# Patient Record
Sex: MALE | Race: WHITE | NOT HISPANIC OR LATINO | URBAN - METROPOLITAN AREA
[De-identification: names, ages, dates, MRNs, and addresses within clinical notes are randomized per-mention and may not be internally consistent; named-entity substitution may affect disease eponyms.]

---

## 2024-08-08 ENCOUNTER — APPOINTMENT (OUTPATIENT)
Dept: PRIMARY CARE | Facility: CLINIC | Age: 39
End: 2024-08-08
Payer: COMMERCIAL

## 2024-08-08 VITALS
OXYGEN SATURATION: 99 % | HEIGHT: 64 IN | DIASTOLIC BLOOD PRESSURE: 82 MMHG | HEART RATE: 90 BPM | RESPIRATION RATE: 18 BRPM | BODY MASS INDEX: 29.88 KG/M2 | WEIGHT: 175 LBS | SYSTOLIC BLOOD PRESSURE: 130 MMHG

## 2024-08-08 DIAGNOSIS — Z00.00 HEALTHCARE MAINTENANCE: Primary | ICD-10-CM

## 2024-08-08 PROCEDURE — 3008F BODY MASS INDEX DOCD: CPT | Performed by: FAMILY MEDICINE

## 2024-08-08 PROCEDURE — 90715 TDAP VACCINE 7 YRS/> IM: CPT | Performed by: FAMILY MEDICINE

## 2024-08-08 PROCEDURE — 1036F TOBACCO NON-USER: CPT | Performed by: FAMILY MEDICINE

## 2024-08-08 PROCEDURE — 99385 PREV VISIT NEW AGE 18-39: CPT | Performed by: FAMILY MEDICINE

## 2024-08-08 PROCEDURE — 90471 IMMUNIZATION ADMIN: CPT | Performed by: FAMILY MEDICINE

## 2024-08-08 PROCEDURE — 93000 ELECTROCARDIOGRAM COMPLETE: CPT | Performed by: FAMILY MEDICINE

## 2024-08-08 ASSESSMENT — PATIENT HEALTH QUESTIONNAIRE - PHQ9
2. FEELING DOWN, DEPRESSED OR HOPELESS: NOT AT ALL
1. LITTLE INTEREST OR PLEASURE IN DOING THINGS: NOT AT ALL
SUM OF ALL RESPONSES TO PHQ9 QUESTIONS 1 AND 2: 0

## 2024-08-08 ASSESSMENT — ENCOUNTER SYMPTOMS
HEADACHES: 0
SHORTNESS OF BREATH: 0

## 2024-08-08 NOTE — PROGRESS NOTES
"Subjective     Chaparro Umanzor is a 39 y.o. male who presents for Annual Exam.    HPI     Pt is new to practice.  He is doing well.  No concerns.     Review of Systems   Respiratory:  Negative for shortness of breath.    Cardiovascular:  Negative for chest pain.   Neurological:  Negative for headaches.       Objective     Vitals:    24 0818   BP: 130/82   BP Location: Left arm   Patient Position: Sitting   Pulse: 90   Resp: 18   SpO2: 99%   Weight: 79.4 kg (175 lb)   Height: 1.626 m (5' 4\")        No current outpatient medications     No Known Allergies     History reviewed. No pertinent surgical history.     Social History     Tobacco Use    Smoking status: Former     Current packs/day: 0.00     Average packs/day: 0.3 packs/day for 5.2 years (1.3 ttl pk-yrs)     Types: Cigarettes     Start date: 2005     Quit date: 2010     Years since quittin.6    Smokeless tobacco: Never    Tobacco comments:     Occasionally smoked in my early/mid twenties   Vaping Use    Vaping status: Never Used   Substance Use Topics    Alcohol use: Yes     Alcohol/week: 3.0 standard drinks of alcohol     Types: 3 Glasses of wine per week    Drug use: Never        Social History     Substance and Sexual Activity   Alcohol Use Yes    Alcohol/week: 3.0 standard drinks of alcohol    Types: 3 Glasses of wine per week       Family History   Problem Relation Name Age of Onset    Skin cancer Mother Samara Umanzor     Amyloidosis Mother Samara Umanzor     Hypertension Father Tim Umanzor     Manley's palsy Father Tim Umanzor     Transient ischemic attack Father Tim Umanzor         Immunization History   Administered Date(s) Administered    MMR vaccine, subcutaneous (MMR II) 1998    Moderna SARS-CoV-2 Vaccination 2021, 2021, 2021    Tdap vaccine, age 7 year and older (BOOSTRIX, ADACEL) 2024        Physical Exam  Vitals reviewed.   Constitutional:       General: He is not in acute distress.     Appearance: " Normal appearance.   HENT:      Head: Normocephalic and atraumatic.      Right Ear: Tympanic membrane, ear canal and external ear normal.      Left Ear: Tympanic membrane, ear canal and external ear normal.      Nose: Nose normal.      Mouth/Throat:      Mouth: Mucous membranes are moist.      Pharynx: Oropharynx is clear. No oropharyngeal exudate or posterior oropharyngeal erythema.   Eyes:      Extraocular Movements: Extraocular movements intact.      Pupils: Pupils are equal, round, and reactive to light.   Neck:      Thyroid: No thyroid mass or thyromegaly.   Cardiovascular:      Rate and Rhythm: Normal rate and regular rhythm.      Heart sounds: No murmur heard.  Pulmonary:      Effort: Pulmonary effort is normal. No respiratory distress.      Breath sounds: Normal breath sounds. No wheezing, rhonchi or rales.   Abdominal:      General: Abdomen is flat. There is no distension.      Palpations: Abdomen is soft.      Tenderness: There is no abdominal tenderness.   Genitourinary:     Testes: Normal.   Musculoskeletal:         General: Normal range of motion.   Lymphadenopathy:      Cervical: No cervical adenopathy.   Skin:     General: Skin is warm and dry.      Findings: No rash.   Neurological:      General: No focal deficit present.      Mental Status: He is alert and oriented to person, place, and time. Mental status is at baseline.   Psychiatric:         Mood and Affect: Mood normal.         Behavior: Behavior normal.         Problem List Items Addressed This Visit    None  Visit Diagnoses       Healthcare maintenance    -  Primary    Relevant Orders    ECG 12 lead (Clinic Performed) (Completed)    Comprehensive Metabolic Panel    Lipid Panel    CBC and Auto Differential    Hemoglobin A1C            Assessment/Plan     Well Exam - new patient     Vaccines - tdap given today     I recommend yearly flu vaccine and routine COVID vaccinations as indicated     Complete labs    Healthy diet, routine exercise was  discussed with patient      Follow up in 1 year or sooner if needed

## 2024-08-20 ENCOUNTER — APPOINTMENT (OUTPATIENT)
Dept: PRIMARY CARE | Facility: CLINIC | Age: 39
End: 2024-08-20
Payer: COMMERCIAL

## 2024-08-22 ENCOUNTER — LAB (OUTPATIENT)
Dept: LAB | Facility: LAB | Age: 39
End: 2024-08-22
Payer: COMMERCIAL

## 2024-08-22 DIAGNOSIS — Z00.00 HEALTHCARE MAINTENANCE: ICD-10-CM

## 2024-08-22 LAB
ALBUMIN SERPL BCP-MCNC: 4.6 G/DL (ref 3.4–5)
ALP SERPL-CCNC: 57 U/L (ref 33–120)
ALT SERPL W P-5'-P-CCNC: 32 U/L (ref 10–52)
ANION GAP SERPL CALC-SCNC: 10 MMOL/L (ref 10–20)
AST SERPL W P-5'-P-CCNC: 16 U/L (ref 9–39)
BASOPHILS # BLD AUTO: 0.04 X10*3/UL (ref 0–0.1)
BASOPHILS NFR BLD AUTO: 0.8 %
BILIRUB SERPL-MCNC: 0.6 MG/DL (ref 0–1.2)
BUN SERPL-MCNC: 13 MG/DL (ref 6–23)
CALCIUM SERPL-MCNC: 10 MG/DL (ref 8.6–10.3)
CHLORIDE SERPL-SCNC: 103 MMOL/L (ref 98–107)
CHOLEST SERPL-MCNC: 275 MG/DL (ref 0–199)
CHOLESTEROL/HDL RATIO: 4.8
CO2 SERPL-SCNC: 31 MMOL/L (ref 21–32)
CREAT SERPL-MCNC: 1.17 MG/DL (ref 0.5–1.3)
EGFRCR SERPLBLD CKD-EPI 2021: 81 ML/MIN/1.73M*2
EOSINOPHIL # BLD AUTO: 0.18 X10*3/UL (ref 0–0.7)
EOSINOPHIL NFR BLD AUTO: 3.4 %
ERYTHROCYTE [DISTWIDTH] IN BLOOD BY AUTOMATED COUNT: 12.7 % (ref 11.5–14.5)
EST. AVERAGE GLUCOSE BLD GHB EST-MCNC: 85 MG/DL
GLUCOSE SERPL-MCNC: 96 MG/DL (ref 74–99)
HBA1C MFR BLD: 4.6 %
HCT VFR BLD AUTO: 45.9 % (ref 41–52)
HDLC SERPL-MCNC: 57.7 MG/DL
HGB BLD-MCNC: 15 G/DL (ref 13.5–17.5)
IMM GRANULOCYTES # BLD AUTO: 0.02 X10*3/UL (ref 0–0.7)
IMM GRANULOCYTES NFR BLD AUTO: 0.4 % (ref 0–0.9)
LDLC SERPL CALC-MCNC: 192 MG/DL
LYMPHOCYTES # BLD AUTO: 1.59 X10*3/UL (ref 1.2–4.8)
LYMPHOCYTES NFR BLD AUTO: 30.3 %
MCH RBC QN AUTO: 30 PG (ref 26–34)
MCHC RBC AUTO-ENTMCNC: 32.7 G/DL (ref 32–36)
MCV RBC AUTO: 92 FL (ref 80–100)
MONOCYTES # BLD AUTO: 0.42 X10*3/UL (ref 0.1–1)
MONOCYTES NFR BLD AUTO: 8 %
NEUTROPHILS # BLD AUTO: 3 X10*3/UL (ref 1.2–7.7)
NEUTROPHILS NFR BLD AUTO: 57.1 %
NON HDL CHOLESTEROL: 217 MG/DL (ref 0–149)
NRBC BLD-RTO: 0 /100 WBCS (ref 0–0)
PLATELET # BLD AUTO: 204 X10*3/UL (ref 150–450)
POTASSIUM SERPL-SCNC: 4.7 MMOL/L (ref 3.5–5.3)
PROT SERPL-MCNC: 7.1 G/DL (ref 6.4–8.2)
RBC # BLD AUTO: 5 X10*6/UL (ref 4.5–5.9)
SODIUM SERPL-SCNC: 139 MMOL/L (ref 136–145)
TRIGL SERPL-MCNC: 126 MG/DL (ref 0–149)
VLDL: 25 MG/DL (ref 0–40)
WBC # BLD AUTO: 5.3 X10*3/UL (ref 4.4–11.3)

## 2024-08-22 PROCEDURE — 83036 HEMOGLOBIN GLYCOSYLATED A1C: CPT

## 2024-08-22 PROCEDURE — 80053 COMPREHEN METABOLIC PANEL: CPT

## 2024-08-22 PROCEDURE — 36415 COLL VENOUS BLD VENIPUNCTURE: CPT

## 2024-08-22 PROCEDURE — 80061 LIPID PANEL: CPT

## 2024-08-22 PROCEDURE — 85025 COMPLETE CBC W/AUTO DIFF WBC: CPT

## 2024-11-09 ENCOUNTER — ANCILLARY PROCEDURE (OUTPATIENT)
Dept: URGENT CARE | Age: 39
End: 2024-11-09
Payer: COMMERCIAL

## 2024-11-09 ENCOUNTER — OFFICE VISIT (OUTPATIENT)
Dept: URGENT CARE | Age: 39
End: 2024-11-09
Payer: COMMERCIAL

## 2024-11-09 VITALS
BODY MASS INDEX: 28.17 KG/M2 | RESPIRATION RATE: 20 BRPM | DIASTOLIC BLOOD PRESSURE: 86 MMHG | TEMPERATURE: 98.2 F | WEIGHT: 165 LBS | OXYGEN SATURATION: 98 % | SYSTOLIC BLOOD PRESSURE: 137 MMHG | HEART RATE: 73 BPM | HEIGHT: 64 IN

## 2024-11-09 DIAGNOSIS — M79.672 ACUTE FOOT PAIN, LEFT: ICD-10-CM

## 2024-11-09 DIAGNOSIS — S92.352A CLOSED DISPLACED FRACTURE OF FIFTH METATARSAL BONE OF LEFT FOOT, INITIAL ENCOUNTER: Primary | ICD-10-CM

## 2024-11-09 DIAGNOSIS — S92.215B: ICD-10-CM

## 2024-11-09 PROCEDURE — 73630 X-RAY EXAM OF FOOT: CPT | Mod: LEFT SIDE

## 2024-11-09 ASSESSMENT — ENCOUNTER SYMPTOMS
NUMBNESS: 0
WEAKNESS: 0
JOINT SWELLING: 1
MYALGIAS: 0
NECK PAIN: 0
NECK STIFFNESS: 0
BACK PAIN: 0

## 2024-11-09 ASSESSMENT — PAIN SCALES - GENERAL: PAINLEVEL_OUTOF10: 7

## 2024-11-09 NOTE — PROGRESS NOTES
"Subjective   Patient ID: Chaparro Umanzor is a 39 y.o. male. They present today with a chief complaint of Injury (Slipped off a ladder today.  His left foot got caught in a rung as he was falling.  Difficult putting weight on the foot.).    History of Present Illness  Subjective  Chaparro Umanzor is a 39 y.o. male who presents with left foot pain. Onset of the symptoms was today. Precipitating event: Foot became stuck in ladder rung during fall. Denies head or neck injury. Current symptoms include: ability to bear weight, but with some pain, bruising, pain at the lateral and distal aspect of the fifth metatarsal and swelling. Aggravating factors: any weight bearing, standing, and walking. Symptoms have progressed to a point and plateaued. Patient has had no prior foot problems. Evaluation to date: none. Treatment to date: avoidance of offending activity.    Objective  /86 (BP Location: Left arm, Patient Position: Sitting)   Pulse 73   Temp 36.8 °C (98.2 °F)   Resp 20   Ht 1.626 m (5' 4\")   Wt 74.8 kg (165 lb)   SpO2 98%   BMI 28.32 kg/m²   Right foot:  normal exam, no swelling, tenderness, instability; ligaments intact, full range of motion of all ankle/foot joints  Left foot:  soft tissue swelling noted over the distal and proximal fifth metatarsal , tenderness of the 5th metatarsal head, ecchymoses and swelling of the 5th toe, and normal microcirculation and capillary reflow    Imaging:  X-ray of the left foot: fracture of distal fifth metatarsal and cuboid.     Assessment/Plan  Fracture of distal fifth metatarsal and cuboid..  Natural history and expected course discussed. Questions answered.  Educational material distributed.  Rest, ice, compression, and elevation (RICE) therapy.  OTC analgesics as needed.  Orthopedics referral.  Follow up in 1 day.        History provided by:  Patient   used: No    Injury  Associated symptoms: no myalgias        Past Medical History  Allergies as of " "11/09/2024    (No Known Allergies)       (Not in a hospital admission)       Past Medical History:   Diagnosis Date    Allergic 2022       No past surgical history on file.     reports that he quit smoking about 14 years ago. His smoking use included cigarettes. He started smoking about 19 years ago. He has a 1.3 pack-year smoking history. He has never used smokeless tobacco. He reports current alcohol use of about 3.0 standard drinks of alcohol per week. He reports that he does not use drugs.    Review of Systems  Review of Systems   Musculoskeletal:  Positive for gait problem and joint swelling. Negative for back pain, myalgias, neck pain and neck stiffness.   Neurological:  Negative for weakness and numbness.                                  Objective    Vitals:    11/09/24 1507   BP: 137/86   BP Location: Left arm   Patient Position: Sitting   Pulse: 73   Resp: 20   Temp: 36.8 °C (98.2 °F)   SpO2: 98%   Weight: 74.8 kg (165 lb)   Height: 1.626 m (5' 4\")     No LMP for male patient.    Physical Exam  Vitals and nursing note reviewed.   Constitutional:       General: He is not in acute distress.     Appearance: Normal appearance. He is normal weight. He is not ill-appearing, toxic-appearing or diaphoretic.   Cardiovascular:      Rate and Rhythm: Normal rate.      Pulses: Normal pulses.   Pulmonary:      Effort: Pulmonary effort is normal.   Musculoskeletal:         General: Swelling, tenderness and signs of injury present. No deformity.   Skin:     General: Skin is warm and dry.      Capillary Refill: Capillary refill takes less than 2 seconds.      Findings: Bruising present.   Neurological:      General: No focal deficit present.      Mental Status: He is alert.      Sensory: No sensory deficit.         Procedures    Point of Care Test & Imaging Results from this visit  No results found for this visit on 11/09/24.   No results found.    Diagnostic study results (if any) were reviewed by Pola Becerril, " APRN-CNP.    Assessment/Plan   Allergies, medications, history, and pertinent labs/EKGs/Imaging reviewed by KARINA Castillo.     Medical Decision Making    Urgent Care Course:  Patient presents to the ED with left foot pain after injury.   Patient is afebrile, hemodynamically stable.  Patient denies fever, n/v, cp, sob, ab pain, dysuria, diarrhea.  XR of left foot with nondisplaced fracture of fifth metatarsal and possible fracture left cuboid.  Applied splint and provided with crutches.  He was referred to ortho walk-in clinic and will follow-up Monday morning.  Patient educated about fractures and will take acetaminophen and ibuprofen for pain. Patient given fracture warning signs ortho.   Prior external records reviewed: Outpatient records  Reviewed pertinent findings from resulted labs: None   Imaging and EKG: Interpretation by radiology and independently interpreted by me.  XR of ankle - no acute fx/dislocation   Independent Hx provided by:  Patient  Med Rx considered but ultimately not given:  Opioids  Social determinant that may affects healthcare:  None  Pt's case/impression summarized and discussed with:  Patient  Likely Dx given clinical picture:  Nondisplaced fracture of fifth metatarsal and left cuboid  Although not an exhaustive list of Differential Diagnosis (though considered), patient's HPI, PE, and other findings are not suggestive of:  L ankle sprain, L ankle fracture, gout, pseudogout, ankle dislocation, osteomyelitis    Patient at time of discharge was clinically well-appearing and HDS for outpatient management. The patient and/or family was given the opportunity to ask questions prior to discharge, understood my verbal discussion of the plans for treatment, expected course, indications to return to ED, and the need for timely follow up as directed.    Condition: Stable  Disposition: Discharge    Orders and Diagnoses  Diagnoses and all orders for this visit:  Closed displaced fracture of  fifth metatarsal bone of left foot, initial encounter  -     XR foot left 3+ views; Future      Medical Admin Record      Patient disposition: Home    Electronically signed by MICAH Castillo-ZE  3:39 PM

## 2024-11-09 NOTE — PATIENT INSTRUCTIONS
Given  handout for injury clinic  RICE  NSAID/Tylenol as needed  Advised on healing time for injury  Avoid any activity that will worsen pain  Avoid weightbearing and use crutches  Advised on s/s to seek emergent care for

## 2024-11-11 ENCOUNTER — OFFICE VISIT (OUTPATIENT)
Dept: ORTHOPEDIC SURGERY | Facility: CLINIC | Age: 39
End: 2024-11-11
Payer: COMMERCIAL

## 2024-11-11 DIAGNOSIS — S92.352A CLOSED FRACTURE OF BASE OF FIFTH METATARSAL BONE OF LEFT FOOT, INITIAL ENCOUNTER: ICD-10-CM

## 2024-11-11 PROCEDURE — 28450 TX TARSAL B1 FX W/O MNPJ EA: CPT | Performed by: INTERNAL MEDICINE

## 2024-11-11 PROCEDURE — 99204 OFFICE O/P NEW MOD 45 MIN: CPT | Performed by: INTERNAL MEDICINE

## 2024-11-11 PROCEDURE — 99213 OFFICE O/P EST LOW 20 MIN: CPT | Mod: 57 | Performed by: INTERNAL MEDICINE

## 2024-11-11 PROCEDURE — 28470 CLTX METATARSAL FX WO MNP EA: CPT | Performed by: INTERNAL MEDICINE

## 2024-11-11 NOTE — PROGRESS NOTES
Acute Injury New Patient Visit    CC:   Chief Complaint   Patient presents with    Left Foot - Pain       HPI: Chaparro is a 39 y.o. male presents today for initial evaluation for acute left foot injury sustained two days ago after he slipped from a ladder and his left foot got caught in a ladder. He was evaluated at the urgent care, x-rays were taken. He is here for initial evaluation.         Review of Systems   GENERAL: Negative for malaise, significant weight loss, fever  MUSCULOSKELETAL: See HPI  NEURO:  Negative for numbness / tingling     Past Medical History  Past Medical History:   Diagnosis Date    Allergic        Medication review  Medication Documentation Review Audit       Reviewed by KARINA Castillo (Nurse Practitioner) on 24 at 1606      Medication Order Taking? Sig Documenting Provider Last Dose Status            No Medications to Display                                   Allergies  No Known Allergies    Social History  Social History     Socioeconomic History    Marital status:      Spouse name: Allie    Number of children: 2    Years of education: Not on file    Highest education level: Not on file   Occupational History    Occupation: Tech consultant   Tobacco Use    Smoking status: Former     Current packs/day: 0.00     Average packs/day: 0.3 packs/day for 5.2 years (1.3 ttl pk-yrs)     Types: Cigarettes     Start date: 2005     Quit date: 2010     Years since quittin.8    Smokeless tobacco: Never    Tobacco comments:     Occasionally smoked in my early/mid twenties   Vaping Use    Vaping status: Never Used   Substance and Sexual Activity    Alcohol use: Yes     Alcohol/week: 3.0 standard drinks of alcohol     Types: 3 Glasses of wine per week    Drug use: Never    Sexual activity: Yes     Partners: Female     Birth control/protection: Other   Other Topics Concern    Not on file   Social History Narrative    Not on file     Social Drivers of Health      Financial Resource Strain: Not on file   Food Insecurity: Not on file   Transportation Needs: Not on file   Physical Activity: Not on file   Stress: Not on file   Social Connections: Not on file   Intimate Partner Violence: Not on file   Housing Stability: Not on file       Surgical History  No past surgical history on file.    Physical Exam:  GENERAL:  Patient is awake, alert, and oriented to person place and time.  Patient appears well nourished and well kept.  Affect Calm, Not Acutely Distressed.  HEENT:  Normocephalic, Atraumatic, EOMI  CARDIOVASCULAR:  Hemodynamically stable.  RESPIRATORY:  Normal respirations with unlabored breathing.  Extremity: Left foot examination shows skin is intact.  There is no erythema or warmth.  Swelling on the lateral aspect the left foot with slight bruising and ecchymosis.  No obvious deformity.  There is no clinical signs of infection.  Pain over the distal fifth metatarsal bone.  There is no pain over the base of the fifth metatarsal bone.  There is no pain in the midfoot.  No pain over the metatarsal bones.  Pain of the cuboid bone.  There is no pain in the calcaneus.  There is no pain over the plantar aponeurosis.  There is no pain over the proximal phalanx of the right great toe.  No pain over distal phalanx of the right great toe.  Neurovascularly intact.      Diagnostics: X-rays reviewed  XR foot left 3+ views  Narrative: Interpreted By:  Dorene Maradiaga,   STUDY:  Left foot, 3 views      INDICATION:  Signs/Symptoms:fell off of a ladder today and got his left foot  caught in a rung      COMPARISON:  None.      ACCESSION NUMBER(S):  [id number]      ORDERING CLINICIAN:  FRANCESCA XIAO      FINDINGS:  Nondisplaced and mildly angulated 5th metatarsal neck fracture.  Subtle lucency of the lateral cuboid on oblique view equivocal for a  small nondisplaced fracture. No significant degenerative changes.  Soft tissues are within normal limits.      Impression: 1. Nondisplaced  and mildly angulated 5th metatarsal neck fracture.  2. Subtle lucency of the lateral cuboid on oblique view equivocal for  a small nondisplaced fracture.      MACRO:  None.      Signed by: Dorene Maradiaga 11/9/2024 3:44 PM  Dictation workstation:   [id number]      Procedure: None    Assessment:   Left distal fifth metatarsal fracture  Left cuboid fracture     Plan: Chaparro presents today for initial evaluation for acute left foot injury sustained two days ago after he slipped from a ladder and his left foot got caught in the ladder. We recommended non surgical treatment by placing him into a walking fracture boot, he may weight bear as tolerated.  He may take the fracture boot off to shower and skin care.  He will follow-up in 3-4 weeks, we will repeat, x-rays of the left foot, 3 views, AP, lateral, and oblique views.     No orders of the defined types were placed in this encounter.     At the conclusion of the visit there were no further questions by the patient/family regarding their plan of care.  Patient was instructed to call or return with any issues, questions, or concerns regarding their injury and/or treatment plan described above.     11/11/24 at 11:21 AM - Chuck Lopez MD  Scribe Attestation  By signing my name below, I, Iban Mindi, Scribfrancisco   attest that this documentation has been prepared under the direction and in the presence of Chuck Lopez MD.    Office: [phone]    This note was prepared using voice recognition software.  The details of this note are correct and have been reviewed, and corrected to the best of my ability.  Some grammatical errors may persist related to the Dragon software.

## 2024-11-11 NOTE — LETTER
November 11, 2024     Patient: Chaparro Umanzor   YOB: 1985   Date of Visit: 11/11/2024       To Whom It May Concern:    It is my medical opinion that Chaparro Umanzor  may return to work with sit down work only for 3 weeks .    If you have any questions or concerns, please don't hesitate to call.         Sincerely,        Chuck Lopez MD

## 2024-12-05 ENCOUNTER — OFFICE VISIT (OUTPATIENT)
Dept: ORTHOPEDIC SURGERY | Facility: CLINIC | Age: 39
End: 2024-12-05
Payer: COMMERCIAL

## 2024-12-05 ENCOUNTER — APPOINTMENT (OUTPATIENT)
Dept: ORTHOPEDIC SURGERY | Facility: CLINIC | Age: 39
End: 2024-12-05
Payer: COMMERCIAL

## 2024-12-05 ENCOUNTER — HOSPITAL ENCOUNTER (OUTPATIENT)
Dept: RADIOLOGY | Facility: CLINIC | Age: 39
Discharge: HOME | End: 2024-12-05
Payer: COMMERCIAL

## 2024-12-05 DIAGNOSIS — S92.215B: ICD-10-CM

## 2024-12-05 DIAGNOSIS — S92.352A CLOSED DISPLACED FRACTURE OF FIFTH METATARSAL BONE OF LEFT FOOT, INITIAL ENCOUNTER: ICD-10-CM

## 2024-12-05 PROCEDURE — 73630 X-RAY EXAM OF FOOT: CPT | Mod: LT

## 2024-12-05 PROCEDURE — 99211 OFF/OP EST MAY X REQ PHY/QHP: CPT | Performed by: INTERNAL MEDICINE

## 2024-12-05 NOTE — PROGRESS NOTES
CC:   Chief Complaint   Patient presents with    Left Foot - Follow-up     Left distal fifth metatarsal fracture  Left cuboid fracture   Xrays today       HPI: Chaparro is a 39 y.o. male presents today for reevaluation for left distal fifth metatarsal fracture and left cuboid fracture. He states that he is doing well. Repeat x-rays today. He is currently wearing his fracture boot.           Review of Systems   GENERAL: Negative for malaise, significant weight loss, fever  MUSCULOSKELETAL: See HPI  NEURO:  Negative for numbness / tingling     Past Medical History  Past Medical History:   Diagnosis Date    Allergic        Medication review  Medication Documentation Review Audit       Reviewed by Aniya Brumfield MA (Medical Assistant) on 24 at 0924      Medication Order Taking? Sig Documenting Provider Last Dose Status            No Medications to Display                                   Allergies  No Known Allergies    Social History  Social History     Socioeconomic History    Marital status:      Spouse name: Allie    Number of children: 2    Years of education: Not on file    Highest education level: Not on file   Occupational History    Occupation: Tech consultant   Tobacco Use    Smoking status: Former     Current packs/day: 0.00     Average packs/day: 0.3 packs/day for 5.2 years (1.3 ttl pk-yrs)     Types: Cigarettes     Start date: 2005     Quit date: 2010     Years since quittin.9    Smokeless tobacco: Never    Tobacco comments:     Occasionally smoked in my early/mid twenties   Vaping Use    Vaping status: Never Used   Substance and Sexual Activity    Alcohol use: Yes     Alcohol/week: 3.0 standard drinks of alcohol     Types: 3 Glasses of wine per week    Drug use: Never    Sexual activity: Yes     Partners: Female     Birth control/protection: Other   Other Topics Concern    Not on file   Social History Narrative    Not on file     Social Drivers of Health     Financial  Resource Strain: Not on file   Food Insecurity: Not on file   Transportation Needs: Not on file   Physical Activity: Not on file   Stress: Not on file   Social Connections: Not on file   Intimate Partner Violence: Not on file   Housing Stability: Not on file       Surgical History  History reviewed. No pertinent surgical history.    Physical Exam:  GENERAL:  Patient is awake, alert, and oriented to person place and time.  Patient appears well nourished and well kept.  Affect Calm, Not Acutely Distressed.  HEENT:  Normocephalic, Atraumatic, EOMI  CARDIOVASCULAR:  Hemodynamically stable.  RESPIRATORY:  Normal respirations with unlabored breathing.  Extremity: Left foot examination shows skin is intact.  There is no erythema or warmth.  Resolved swelling on the lateral aspect the left foot with resolved bruising and ecchymosis.  No obvious deformity.  There is no clinical signs of infection.  Minimal pain over the distal fifth metatarsal bone.  There is no pain over the base of the fifth metatarsal bone.  There is no pain in the midfoot.  No pain over the metatarsal bones.  No pain of the cuboid bone.  There is no pain in the calcaneus.  There is no pain over the plantar aponeurosis.  There is no pain over the proximal phalanx of the right great toe.  No pain over distal phalanx of the right great toe.  Neurovascularly intact.       Diagnostics: X-rays reviewed  XR foot left 3+ views  Narrative: Interpreted By:  Dorene Maradiaga,   STUDY:  Left foot, 3 views      INDICATION:  Signs/Symptoms:fell off of a ladder today and got his left foot  caught in a rung      COMPARISON:  None.      ACCESSION NUMBER(S):  [id number]      ORDERING CLINICIAN:  FRANCESCA XIAO      FINDINGS:  Nondisplaced and mildly angulated 5th metatarsal neck fracture.  Subtle lucency of the lateral cuboid on oblique view equivocal for a  small nondisplaced fracture. No significant degenerative changes.  Soft tissues are within normal limits.       Impression: 1. Nondisplaced and mildly angulated 5th metatarsal neck fracture.  2. Subtle lucency of the lateral cuboid on oblique view equivocal for  a small nondisplaced fracture.      MACRO:  None.      Signed by: Dorene Maradiaga 11/9/2024 3:44 PM  Dictation workstation:   [id number]        Procedure: None    Assessment:   Left distal fifth metatarsal fracture  Left cuboid fracture     Plan: Chaparro presents today for reevaluation for left distal fifth metatarsal fracture and left cuboid fracture. He is clinically doing well, no pain. X-rays showed satisfactory healing fracture. We placed him into a carbon fiber plate, he may weight-bear as tolerated. He will follow-up in 4-5 weeks, we will repeat x-rays of the left foot, 3 views, AP, lateral, and oblique views.     Orders Placed This Encounter    XR foot left 3+ views      At the conclusion of the visit there were no further questions by the patient/family regarding their plan of care.  Patient was instructed to call or return with any issues, questions, or concerns regarding their injury and/or treatment plan described above.     12/05/24 at 9:35 AM - Chuck Lopez MD  Scribe Attestation  By signing my name below, I, Iban Mindi, Scribfrancisco   attest that this documentation has been prepared under the direction and in the presence of Chuck Lopez MD.    Office: [phone]    This note was prepared using voice recognition software.  The details of this note are correct and have been reviewed, and corrected to the best of my ability.  Some grammatical errors may persist related to the Dragon software.

## 2025-01-02 ENCOUNTER — OFFICE VISIT (OUTPATIENT)
Dept: ORTHOPEDIC SURGERY | Facility: CLINIC | Age: 40
End: 2025-01-02
Payer: COMMERCIAL

## 2025-01-02 ENCOUNTER — HOSPITAL ENCOUNTER (OUTPATIENT)
Dept: RADIOLOGY | Facility: CLINIC | Age: 40
Discharge: HOME | End: 2025-01-02
Payer: COMMERCIAL

## 2025-01-02 DIAGNOSIS — S92.352A CLOSED DISPLACED FRACTURE OF FIFTH METATARSAL BONE OF LEFT FOOT, INITIAL ENCOUNTER: ICD-10-CM

## 2025-01-02 PROCEDURE — 73630 X-RAY EXAM OF FOOT: CPT | Mod: LT

## 2025-01-02 PROCEDURE — 99024 POSTOP FOLLOW-UP VISIT: CPT | Performed by: INTERNAL MEDICINE

## 2025-01-02 PROCEDURE — 99211 OFF/OP EST MAY X REQ PHY/QHP: CPT | Performed by: INTERNAL MEDICINE

## 2025-01-02 PROCEDURE — 73630 X-RAY EXAM OF FOOT: CPT | Mod: LEFT SIDE | Performed by: INTERNAL MEDICINE

## 2025-01-02 PROCEDURE — 1036F TOBACCO NON-USER: CPT | Performed by: INTERNAL MEDICINE

## 2025-01-02 NOTE — PROGRESS NOTES
CC:   No chief complaint on file.      HPI: Chaparro is a 39 y.o. male presents today for reevaluation for left distal fifth metatarsal fracture and left cuboid fracture. He states that he is doing well. Repeat x-rays today. He is currently wearing his fracture boot.         Review of Systems   GENERAL: Negative for malaise, significant weight loss, fever  MUSCULOSKELETAL: See HPI  NEURO:  Negative for numbness / tingling     Past Medical History  Past Medical History:   Diagnosis Date    Allergic 2022       Medication review  Medication Documentation Review Audit       Reviewed by Aniya Brumfield MA (Medical Assistant) on 12/05/24 at 0924      Medication Order Taking? Sig Documenting Provider Last Dose Status            No Medications to Display                                   Allergies  No Known Allergies    Social History  Social History     Socioeconomic History    Marital status:      Spouse name: Allie    Number of children: 2    Years of education: Not on file    Highest education level: Not on file   Occupational History    Occupation: Tech consultant   Tobacco Use    Smoking status: Former     Current packs/day: 0.00     Average packs/day: 0.3 packs/day for 5.2 years (1.3 ttl pk-yrs)     Types: Cigarettes     Start date: 1/1/2005     Quit date: 1/1/2010     Years since quitting: 15.0    Smokeless tobacco: Never    Tobacco comments:     Occasionally smoked in my early/mid twenties   Vaping Use    Vaping status: Never Used   Substance and Sexual Activity    Alcohol use: Yes     Alcohol/week: 3.0 standard drinks of alcohol     Types: 3 Glasses of wine per week    Drug use: Never    Sexual activity: Yes     Partners: Female     Birth control/protection: Other   Other Topics Concern    Not on file   Social History Narrative    Not on file     Social Drivers of Health     Financial Resource Strain: Not on file   Food Insecurity: Not on file   Transportation Needs: Not on file   Physical Activity: Not  on file   Stress: Not on file   Social Connections: Not on file   Intimate Partner Violence: Not on file   Housing Stability: Not on file       Surgical History  No past surgical history on file.    Physical Exam:  GENERAL:  Patient is awake, alert, and oriented to person place and time.  Patient appears well nourished and well kept.  Affect Calm, Not Acutely Distressed.  HEENT:  Normocephalic, Atraumatic, EOMI  CARDIOVASCULAR:  Hemodynamically stable.  RESPIRATORY:  Normal respirations with unlabored breathing.  Extremity: Left foot examination shows skin is intact.  There is no erythema or warmth.  No swelling on the lateral aspect the left foot with resolved bruising and ecchymosis.  No obvious deformity.  There is no clinical signs of infection.  No pain over the distal fifth metatarsal bone.  There is no pain over the base of the fifth metatarsal bone.  There is no pain in the midfoot.  No pain over the metatarsal bones.  No pain of the cuboid bone.  There is no pain in the calcaneus.  There is no pain over the plantar aponeurosis.  There is no pain over the proximal phalanx of the right great toe.  No pain over distal phalanx of the right great toe.  Neurovascularly intact.       Diagnostics: X-rays reviewed  XR foot left 3+ views  Interpreted By:  Chuck Mcgovern,   STUDY:  XR FOOT LEFT 3+ VIEWS, 12/5/2024 9:32 am      INDICATION:  Signs/Symptoms:pain      ACCESSION NUMBER(S):  [id number]      ORDERING CLINICIAN:  CHUCK MCGOVERN      FINDINGS:  Left foot x-rays three views AP, lateral and oblique view: Stable  appearing and satisfactory healing nondisplaced 5th metatarsal neck  fracture, showing signs of interval healing with increased callus  formation. Satisfactory healing nondisplaced cuboid fracture of the  lateral aspect, showing signs of interval healing with increased  callus formation.          Signed by: Chuck Mcgovern 12/5/2024 12:34 PM  Dictation workstation:   [id number]        Procedure:  None    Assessment:   Left distal fifth metatarsal fracture  Left cuboid fracture     Plan: Chaparro presents today for reevaluation for left distal fifth metatarsal fracture and left cuboid fracture. He is clinically doing well, no pain. X-rays showed satisfactory healing fracture.  He will continue with his carbon fiber plate, he may weight-bear as tolerated, he may discontinue the carbon fiber plate after 4 weeks, then gradual return to all activities as tolerated.  He will follow-up as needed.      No orders of the defined types were placed in this encounter.     At the conclusion of the visit there were no further questions by the patient/family regarding their plan of care.  Patient was instructed to call or return with any issues, questions, or concerns regarding their injury and/or treatment plan described above.     01/02/25 at 9:23 AM - Chuck Lopez MD  Scribe Attestation  By signing my name below, I, Iban Wuananya, Scribe   attest that this documentation has been prepared under the direction and in the presence of Chuck Lopez MD.    Office: [phone]    This note was prepared using voice recognition software.  The details of this note are correct and have been reviewed, and corrected to the best of my ability.  Some grammatical errors may persist related to the Dragon software.